# Patient Record
Sex: FEMALE | Race: WHITE | HISPANIC OR LATINO
[De-identification: names, ages, dates, MRNs, and addresses within clinical notes are randomized per-mention and may not be internally consistent; named-entity substitution may affect disease eponyms.]

---

## 2017-06-05 ENCOUNTER — APPOINTMENT (OUTPATIENT)
Dept: FAMILY MEDICINE | Facility: CLINIC | Age: 72
End: 2017-06-05

## 2017-06-05 VITALS — SYSTOLIC BLOOD PRESSURE: 122 MMHG | RESPIRATION RATE: 16 BRPM | DIASTOLIC BLOOD PRESSURE: 100 MMHG

## 2017-06-05 DIAGNOSIS — E55.9 VITAMIN D DEFICIENCY, UNSPECIFIED: ICD-10-CM

## 2017-06-05 DIAGNOSIS — M17.9 OSTEOARTHRITIS OF KNEE, UNSPECIFIED: ICD-10-CM

## 2017-06-06 LAB
25(OH)D3 SERPL-MCNC: 22.9 NG/ML
ALBUMIN SERPL ELPH-MCNC: 4.4 G/DL
ALP BLD-CCNC: 68 U/L
ALT SERPL-CCNC: 17 U/L
ANION GAP SERPL CALC-SCNC: 23 MMOL/L
AST SERPL-CCNC: 22 U/L
BILIRUB SERPL-MCNC: 0.8 MG/DL
BUN SERPL-MCNC: 15 MG/DL
CALCIUM SERPL-MCNC: 9.6 MG/DL
CHLORIDE SERPL-SCNC: 101 MMOL/L
CHOLEST SERPL-MCNC: 192 MG/DL
CHOLEST/HDLC SERPL: 2.7 RATIO
CO2 SERPL-SCNC: 19 MMOL/L
CREAT SERPL-MCNC: 0.81 MG/DL
GLUCOSE SERPL-MCNC: 74 MG/DL
HBA1C MFR BLD HPLC: 5.4 %
HDLC SERPL-MCNC: 70 MG/DL
LDLC SERPL CALC-MCNC: 106 MG/DL
POTASSIUM SERPL-SCNC: 4 MMOL/L
PROT SERPL-MCNC: 7.3 G/DL
SODIUM SERPL-SCNC: 143 MMOL/L
TRIGL SERPL-MCNC: 79 MG/DL

## 2017-06-06 RX ORDER — MULTIVITAMIN
TABLET ORAL
Qty: 30 | Refills: 0 | Status: COMPLETED | COMMUNITY
Start: 2016-12-28

## 2017-06-06 RX ORDER — LISINOPRIL 10 MG/1
10 TABLET ORAL
Qty: 30 | Refills: 0 | Status: COMPLETED | COMMUNITY
Start: 2016-12-28

## 2017-07-13 ENCOUNTER — APPOINTMENT (OUTPATIENT)
Dept: FAMILY MEDICINE | Facility: CLINIC | Age: 72
End: 2017-07-13

## 2017-10-10 ENCOUNTER — APPOINTMENT (OUTPATIENT)
Dept: FAMILY MEDICINE | Facility: CLINIC | Age: 72
End: 2017-10-10
Payer: MEDICAID

## 2017-10-10 ENCOUNTER — MEDICATION RENEWAL (OUTPATIENT)
Age: 72
End: 2017-10-10

## 2017-10-10 VITALS
DIASTOLIC BLOOD PRESSURE: 100 MMHG | HEIGHT: 65 IN | SYSTOLIC BLOOD PRESSURE: 148 MMHG | BODY MASS INDEX: 33.66 KG/M2 | WEIGHT: 202 LBS

## 2017-10-10 DIAGNOSIS — Z23 ENCOUNTER FOR IMMUNIZATION: ICD-10-CM

## 2017-10-10 DIAGNOSIS — I10 ESSENTIAL (PRIMARY) HYPERTENSION: ICD-10-CM

## 2017-10-10 DIAGNOSIS — H53.9 UNSPECIFIED VISUAL DISTURBANCE: ICD-10-CM

## 2017-10-10 PROCEDURE — G0008: CPT

## 2017-10-10 PROCEDURE — 90688 IIV4 VACCINE SPLT 0.5 ML IM: CPT

## 2017-10-10 PROCEDURE — 99213 OFFICE O/P EST LOW 20 MIN: CPT | Mod: 25

## 2017-10-10 RX ORDER — LISINOPRIL AND HYDROCHLOROTHIAZIDE TABLETS 20; 12.5 MG/1; MG/1
20-12.5 TABLET ORAL
Qty: 90 | Refills: 0 | Status: ACTIVE | COMMUNITY
Start: 2017-10-10 | End: 1900-01-01

## 2017-10-20 ENCOUNTER — EMERGENCY (EMERGENCY)
Facility: HOSPITAL | Age: 72
LOS: 0 days | Discharge: HOME | End: 2017-10-20
Attending: INTERNAL MEDICINE
Payer: MEDICAID

## 2017-10-20 VITALS
TEMPERATURE: 98 F | SYSTOLIC BLOOD PRESSURE: 160 MMHG | OXYGEN SATURATION: 100 % | RESPIRATION RATE: 17 BRPM | WEIGHT: 199.52 LBS | DIASTOLIC BLOOD PRESSURE: 98 MMHG | HEART RATE: 83 BPM | HEIGHT: 66 IN

## 2017-10-20 VITALS — TEMPERATURE: 98 F

## 2017-10-20 DIAGNOSIS — R07.9 CHEST PAIN, UNSPECIFIED: ICD-10-CM

## 2017-10-20 DIAGNOSIS — H61.22 IMPACTED CERUMEN, LEFT EAR: ICD-10-CM

## 2017-10-20 DIAGNOSIS — R42 DIZZINESS AND GIDDINESS: ICD-10-CM

## 2017-10-20 LAB
ALBUMIN SERPL ELPH-MCNC: 3.6 G/DL — SIGNIFICANT CHANGE UP (ref 3.3–5)
ALP SERPL-CCNC: 68 U/L — SIGNIFICANT CHANGE UP (ref 40–120)
ALT FLD-CCNC: 23 U/L — SIGNIFICANT CHANGE UP (ref 12–78)
ANION GAP SERPL CALC-SCNC: 9 MMOL/L — SIGNIFICANT CHANGE UP (ref 5–17)
APPEARANCE UR: CLEAR — SIGNIFICANT CHANGE UP
AST SERPL-CCNC: 20 U/L — SIGNIFICANT CHANGE UP (ref 15–37)
BACTERIA # UR AUTO: ABNORMAL
BASOPHILS # BLD AUTO: 0.1 K/UL — SIGNIFICANT CHANGE UP (ref 0–0.2)
BASOPHILS NFR BLD AUTO: 1.8 % — SIGNIFICANT CHANGE UP (ref 0–2)
BILIRUB SERPL-MCNC: 0.7 MG/DL — SIGNIFICANT CHANGE UP (ref 0.2–1.2)
BILIRUB UR-MCNC: NEGATIVE — SIGNIFICANT CHANGE UP
BUN SERPL-MCNC: 17 MG/DL — SIGNIFICANT CHANGE UP (ref 7–23)
CALCIUM SERPL-MCNC: 8.9 MG/DL — SIGNIFICANT CHANGE UP (ref 8.5–10.1)
CHLORIDE SERPL-SCNC: 107 MMOL/L — SIGNIFICANT CHANGE UP (ref 96–108)
CK MB BLD-MCNC: 2.7 % — SIGNIFICANT CHANGE UP (ref 0–3.5)
CK MB CFR SERPL CALC: 3.9 NG/ML — HIGH (ref 0.5–3.6)
CK SERPL-CCNC: 143 U/L — SIGNIFICANT CHANGE UP (ref 26–192)
CO2 SERPL-SCNC: 23 MMOL/L — SIGNIFICANT CHANGE UP (ref 22–31)
COLOR SPEC: YELLOW — SIGNIFICANT CHANGE UP
CREAT SERPL-MCNC: 0.67 MG/DL — SIGNIFICANT CHANGE UP (ref 0.5–1.3)
DIFF PNL FLD: NEGATIVE — SIGNIFICANT CHANGE UP
EOSINOPHIL # BLD AUTO: 0.1 K/UL — SIGNIFICANT CHANGE UP (ref 0–0.5)
EOSINOPHIL NFR BLD AUTO: 0.9 % — SIGNIFICANT CHANGE UP (ref 0–6)
EPI CELLS # UR: SIGNIFICANT CHANGE UP
GLUCOSE SERPL-MCNC: 102 MG/DL — HIGH (ref 70–99)
GLUCOSE UR QL: NEGATIVE MG/DL — SIGNIFICANT CHANGE UP
HCT VFR BLD CALC: 40.3 % — SIGNIFICANT CHANGE UP (ref 34.5–45)
HGB BLD-MCNC: 13.1 G/DL — SIGNIFICANT CHANGE UP (ref 11.5–15.5)
KETONES UR-MCNC: NEGATIVE — SIGNIFICANT CHANGE UP
LEUKOCYTE ESTERASE UR-ACNC: NEGATIVE — SIGNIFICANT CHANGE UP
LYMPHOCYTES # BLD AUTO: 2.3 K/UL — SIGNIFICANT CHANGE UP (ref 1–3.3)
LYMPHOCYTES # BLD AUTO: 38.9 % — SIGNIFICANT CHANGE UP (ref 13–44)
MCHC RBC-ENTMCNC: 31.8 PG — SIGNIFICANT CHANGE UP (ref 27–34)
MCHC RBC-ENTMCNC: 32.5 GM/DL — SIGNIFICANT CHANGE UP (ref 32–36)
MCV RBC AUTO: 97.9 FL — SIGNIFICANT CHANGE UP (ref 80–100)
MONOCYTES # BLD AUTO: 0.5 K/UL — SIGNIFICANT CHANGE UP (ref 0–0.9)
MONOCYTES NFR BLD AUTO: 7.6 % — SIGNIFICANT CHANGE UP (ref 2–14)
NEUTROPHILS # BLD AUTO: 3 K/UL — SIGNIFICANT CHANGE UP (ref 1.8–7.4)
NEUTROPHILS NFR BLD AUTO: 50.8 % — SIGNIFICANT CHANGE UP (ref 43–77)
NITRITE UR-MCNC: NEGATIVE — SIGNIFICANT CHANGE UP
PH UR: 6 — SIGNIFICANT CHANGE UP (ref 5–8)
PLATELET # BLD AUTO: 172 K/UL — SIGNIFICANT CHANGE UP (ref 150–400)
POTASSIUM SERPL-MCNC: 4.1 MMOL/L — SIGNIFICANT CHANGE UP (ref 3.5–5.3)
POTASSIUM SERPL-SCNC: 4.1 MMOL/L — SIGNIFICANT CHANGE UP (ref 3.5–5.3)
PROT SERPL-MCNC: 8 GM/DL — SIGNIFICANT CHANGE UP (ref 6–8.3)
PROT UR-MCNC: NEGATIVE MG/DL — SIGNIFICANT CHANGE UP
RBC # BLD: 4.12 M/UL — SIGNIFICANT CHANGE UP (ref 3.8–5.2)
RBC # FLD: 11.6 % — SIGNIFICANT CHANGE UP (ref 11–15)
SODIUM SERPL-SCNC: 139 MMOL/L — SIGNIFICANT CHANGE UP (ref 135–145)
SP GR SPEC: 1.01 — SIGNIFICANT CHANGE UP (ref 1.01–1.02)
TROPONIN I SERPL-MCNC: <.015 NG/ML — SIGNIFICANT CHANGE UP (ref 0.01–0.04)
UROBILINOGEN FLD QL: NEGATIVE MG/DL — SIGNIFICANT CHANGE UP
WBC # BLD: 6 K/UL — SIGNIFICANT CHANGE UP (ref 3.8–10.5)
WBC # FLD AUTO: 6 K/UL — SIGNIFICANT CHANGE UP (ref 3.8–10.5)
WBC UR QL: SIGNIFICANT CHANGE UP

## 2017-10-20 PROCEDURE — 71010: CPT | Mod: 26

## 2017-10-20 PROCEDURE — 99285 EMERGENCY DEPT VISIT HI MDM: CPT

## 2017-10-20 PROCEDURE — 70450 CT HEAD/BRAIN W/O DYE: CPT | Mod: 26

## 2017-10-20 RX ORDER — CARBAMIDE PEROXIDE 81.86 MG/ML
5 SOLUTION/ DROPS AURICULAR (OTIC)
Qty: 1 | Refills: 0 | OUTPATIENT
Start: 2017-10-20 | End: 2017-10-23

## 2017-10-20 RX ORDER — SODIUM CHLORIDE 9 MG/ML
3 INJECTION INTRAMUSCULAR; INTRAVENOUS; SUBCUTANEOUS ONCE
Qty: 0 | Refills: 0 | Status: COMPLETED | OUTPATIENT
Start: 2017-10-20 | End: 2017-10-20

## 2017-10-20 RX ORDER — ACETAMINOPHEN 500 MG
650 TABLET ORAL ONCE
Qty: 0 | Refills: 0 | Status: COMPLETED | OUTPATIENT
Start: 2017-10-20 | End: 2017-10-20

## 2017-10-20 RX ORDER — MECLIZINE HCL 12.5 MG
1 TABLET ORAL
Qty: 21 | Refills: 0 | OUTPATIENT
Start: 2017-10-20 | End: 2017-10-27

## 2017-10-20 RX ADMIN — Medication 650 MILLIGRAM(S): at 13:15

## 2017-10-20 RX ADMIN — Medication 650 MILLIGRAM(S): at 16:30

## 2017-10-20 RX ADMIN — SODIUM CHLORIDE 3 MILLILITER(S): 9 INJECTION INTRAMUSCULAR; INTRAVENOUS; SUBCUTANEOUS at 12:25

## 2017-10-20 NOTE — ED ADULT TRIAGE NOTE - CHIEF COMPLAINT QUOTE
pt states, " I have a pain and pressure in my chest, pain in my head and pain in the right hip down to my leg for approx 2 months " pt dizzy in triage

## 2017-10-20 NOTE — ED PROVIDER NOTE - ENMT, MLM
Airway patent, Nasal mucosa clear. Mouth with normal mucosa. Throat has no vesicles, no oropharyngeal exudates and uvula is midline. Airway patent, Nasal mucosa clear. Mouth with normal mucosa. Throat has no vesicles, no oropharyngeal exudates and uvula is midline., cerumen impaction left ear.

## 2017-10-20 NOTE — ED PROVIDER NOTE - CARE PLAN
Principal Discharge DX:	Cerumen impaction  Goal:	refer to ENT specialist, dr Weaver, [phone] for removal of impacted wax

## 2017-10-20 NOTE — ED PROVIDER NOTE - OBJECTIVE STATEMENT
HPI, PMHX, PSHX, social and Family hx  as contained herein HPI, PMHX, PSHX, social and Family hx  as contained herein. dizziness since yesterday, No vomiting, some headache. . No motor weakness. 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  PDHX none. . allergies none socialHx  non contributory, Family Hx  cardiac hx and HTn Hx. meds HCTX/Lisinopril

## 2017-10-20 NOTE — ED ADULT NURSE NOTE - OBJECTIVE STATEMENT
received pt from triage on stretcher c/o dizziness and headache x4 months Received pt from triage on stretcher c/o dizziness and headache x4 months

## 2017-11-09 ENCOUNTER — APPOINTMENT (OUTPATIENT)
Dept: FAMILY MEDICINE | Facility: CLINIC | Age: 72
End: 2017-11-09

## 2018-03-21 DIAGNOSIS — Z56.0 UNEMPLOYMENT, UNSPECIFIED: ICD-10-CM

## 2018-03-21 SDOH — ECONOMIC STABILITY - INCOME SECURITY: UNEMPLOYMENT, UNSPECIFIED: Z56.0
